# Patient Record
Sex: OTHER/UNKNOWN | Race: WHITE | NOT HISPANIC OR LATINO | Employment: STUDENT
[De-identification: names, ages, dates, MRNs, and addresses within clinical notes are randomized per-mention and may not be internally consistent; named-entity substitution may affect disease eponyms.]

---
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
## 2024-01-18 ENCOUNTER — OFFICE VISIT (OUTPATIENT)
Dept: NEUROLOGY | Facility: CLINIC | Age: 19
End: 2024-01-18
Payer: COMMERCIAL

## 2024-01-18 DIAGNOSIS — G43.709 CHRONIC MIGRAINE WITHOUT AURA WITHOUT STATUS MIGRAINOSUS, NOT INTRACTABLE: Primary | ICD-10-CM

## 2024-01-18 PROCEDURE — 64615 CHEMODENERV MUSC MIGRAINE: CPT | Performed by: PSYCHIATRY & NEUROLOGY

## 2024-01-18 NOTE — LETTER
1/18/2024         RE: El Chen  [address] [facility] [address]        Dear Colleague,    Thank you for referring your patient, El Chen, to the [facility] NEUROLOGY CLINICS [facility]. Please see a copy of my visit note below.    [facility]  Botulinum Toxin Procedure    Alea Garsia MD  Headache Neurology    January 18, 2024    Procedure:  OnabotulinumtoxinA injections for chronic migraine  Indication:  Chronic migraine     El Chen suffers from severe intractable headaches. El Chen was referred by Maliha Soriano NP for onabotulinumtoxinA injections for headache.       Their baseline headaches are sharp or throbbing pain located at the temples or behind the eyes with accompanied photophobia, phonophobia, nausea, fatigue and untreated headaches could last 1-2 days.     Prior to initiation of botulinum toxin injections, El reported 30/30 headache days per month, with 5/30 severe headache days per month. El Bobos headaches are quite disabling and often interfere with El Bobos ability to function normally.     Date of last injections: 10/20/23    April reports 30/30 headache days per month currently, with 3 severe headache days per month. Has been in recovery from a bilateral masectomy and this worsened headaches. El Chen has not noticed a wearing off phenomenon prior to this round of botulinum toxin injections.    El reports the following benefits of botulinum toxin injections from their last round: Has needed to miss 3-4 days of housework or events attended due to headaches in a 3 month period and this used to be 4-5 before Botox.     El has attempted other migraine prophylactic treatments in the past, which have included: topiramate, metoprolol, sertraline, gabapentin, magnesium.     El does not currently take other medication for headache prevention.  They use duloxetine,  propranolol for other reasons.       April's pain was assessed prior to the procedure.  El Chen rated El hCen's pain today as 4 out of 10.    Procedural Pause: Procedural pause was conducted to verify correct patient identity, procedure to be performed, correct side and site, correct patient position, and special requirements. Appropriate hand hygiene was utilized, and each injection site was prepped with alcohol wipe or Chloraprep swab.     Procedure Details: 200 units of onabotulinumtoxinA was diluted in 4 mL 0.9% normal saline. A total of 150 units of onabotulinumtoxinA were injected using 30 gauge 0.5 in needles into the muscles listed below. 50 units of onabotulinumtoxinA were wasted.     Injection Sites: Total = 150 units onabotulinumtoxinA      and Procerus muscles - 5 units into the left and right corrugators and 5 units into the procerus (15 units total)    Frontalis muscles - 5 units into the left superior frontalis and 5 units into the right superior frontalis (2 injection sites per muscle) (10 units total)    Temporalis muscles - 12.5 units into the left temporalis muscle and 12.5 units into the right temporalis muscle (2 injection sites per muscle) (25 units total)    Occipitalis muscles - 12.5 units into the left occipitalis muscle and 12.5 units into the right occipitalis muscle (2 injection sites per muscle) (25 units total)    Splenius Capitis muscles - 12.5 units into the left splenius capitis muscle and 12.5 units into the right splenius capitis muscle (2 injection sites per muscle, divided into 2/3 anteriorly and 1/3 posteriorly) (25 units total)      Trapezius muscles - 25 units into the left trapezius muscle and 25 units into the right trapezius muscle (3 injection sites per muscle, divided 5 units, 10 units, 10 units, medial to lateral) (50 units total)      April tolerated the procedure well without immediate complications.  El Chen will follow up in clinic for assessment of the  effectiveness of treatment.  El Chen did not report any change in El Chen's pain level after the botulinumtoxinA injection procedure.    Alea Garsia MD  Headache Neurology  [facility]       Again, thank you for allowing me to participate in the care of your patient.        Sincerely,        Alea Garsia MD

## 2024-01-18 NOTE — PROGRESS NOTES
[facility]  Botulinum Toxin Procedure    Alea Garsia MD  Headache Neurology    January 18, 2024    Procedure:  OnabotulinumtoxinA injections for chronic migraine  Indication:  Chronic migraine     El Chen suffers from severe intractable headaches. El Chen was referred by Maliha Soriano NP for onabotulinumtoxinA injections for headache.       Their baseline headaches are sharp or throbbing pain located at the temples or behind the eyes with accompanied photophobia, phonophobia, nausea, fatigue and untreated headaches could last 1-2 days.     Prior to initiation of botulinum toxin injections, El reported 30/30 headache days per month, with 5/30 severe headache days per month. El Bobos headaches are quite disabling and often interfere with El Bobos ability to function normally.     Date of last injections: 10/20/23    April reports 30/30 headache days per month currently, with 3 severe headache days per month. Has been in recovery from a bilateral masectomy and this worsened headaches. El Chen has not noticed a wearing off phenomenon prior to this round of botulinum toxin injections.    El reports the following benefits of botulinum toxin injections from their last round: Has needed to miss 3-4 days of housework or events attended due to headaches in a 3 month period and this used to be 4-5 before Botox.     El has attempted other migraine prophylactic treatments in the past, which have included: topiramate, metoprolol, sertraline, gabapentin, magnesium.     El does not currently take other medication for headache prevention.  They use duloxetine,  propranolol for other reasons.      Jeramies pain was assessed prior to the procedure.  El Chen rated El Bobos pain today as 4 out of 10.    Procedural Pause: Procedural pause was conducted to verify correct patient identity, procedure to be performed, correct side and site,  correct patient position, and special requirements. Appropriate hand hygiene was utilized, and each injection site was prepped with alcohol wipe or Chloraprep swab.     Procedure Details: 200 units of onabotulinumtoxinA was diluted in 4 mL 0.9% normal saline. A total of 150 units of onabotulinumtoxinA were injected using 30 gauge 0.5 in needles into the muscles listed below. 50 units of onabotulinumtoxinA were wasted.     Injection Sites: Total = 150 units onabotulinumtoxinA      and Procerus muscles - 5 units into the left and right corrugators and 5 units into the procerus (15 units total)    Frontalis muscles - 5 units into the left superior frontalis and 5 units into the right superior frontalis (2 injection sites per muscle) (10 units total)    Temporalis muscles - 12.5 units into the left temporalis muscle and 12.5 units into the right temporalis muscle (2 injection sites per muscle) (25 units total)    Occipitalis muscles - 12.5 units into the left occipitalis muscle and 12.5 units into the right occipitalis muscle (2 injection sites per muscle) (25 units total)    Splenius Capitis muscles - 12.5 units into the left splenius capitis muscle and 12.5 units into the right splenius capitis muscle (2 injection sites per muscle, divided into 2/3 anteriorly and 1/3 posteriorly) (25 units total)      Trapezius muscles - 25 units into the left trapezius muscle and 25 units into the right trapezius muscle (3 injection sites per muscle, divided 5 units, 10 units, 10 units, medial to lateral) (50 units total)      April tolerated the procedure well without immediate complications.  El Chen will follow up in clinic for assessment of the effectiveness of treatment.  El Chen did not report any change in El Chen's pain level after the botulinumtoxinA injection procedure.    Alea Garsia MD  Headache Neurology  [facility]

## 2024-02-06 ENCOUNTER — VIRTUAL VISIT (OUTPATIENT)
Dept: NEUROLOGY | Facility: CLINIC | Age: 19
End: 2024-02-06
Payer: COMMERCIAL

## 2024-02-06 ENCOUNTER — TELEPHONE (OUTPATIENT)
Dept: NEUROLOGY | Facility: CLINIC | Age: 19
End: 2024-02-06

## 2024-02-06 DIAGNOSIS — R51.9 PERSISTENT HEADACHES: Primary | ICD-10-CM

## 2024-02-06 DIAGNOSIS — G43.719 INTRACTABLE CHRONIC MIGRAINE WITHOUT AURA AND WITHOUT STATUS MIGRAINOSUS: ICD-10-CM

## 2024-02-06 PROCEDURE — 99215 OFFICE O/P EST HI 40 MIN: CPT | Mod: 95 | Performed by: NURSE PRACTITIONER

## 2024-02-06 RX ORDER — ATOGEPANT 60 MG/1
60 TABLET ORAL DAILY
Qty: 30 TABLET | Refills: 11 | Status: SHIPPED | OUTPATIENT
Start: 2024-02-06 | End: 2024-08-12

## 2024-02-06 ASSESSMENT — MIGRAINE DISABILITY ASSESSMENT (MIDAS)
TOTAL SCORE: 8
HOW MANY DAYS DID YOU NOT DO HOUSEWORK BECAUSE OF HEADACHES: 1
HOW MANY DAYS IN THE PAST 3 MONTHS HAVE YOU HAD A HEADACHE: 90
HOW OFTEN WERE SOCIAL ACTIVITIES MISSED DUE TO HEADACHES: 1
HOW MANY DAYS WAS YOUR PRODUCTIVITY CUT IN HALF BECAUSE OF HEADACHES: 3
ON A SCALE FROM 0-10 ON AVERAGE HOW PAINFUL WERE HEADACHES: 4
HOW MANY DAYS WAS HOUSEWORK PRODUCTIVITY CUT IN HALF DUE TO HEADACHES: 2
HOW MANY DAYS DID YOU MISS WORK OR SCHOOL BECAUSE OF HEADACHES: 1

## 2024-02-06 ASSESSMENT — HEADACHE IMPACT TEST (HIT 6)
HOW OFTEN DID HEADACHS LIMIT CONCENTRATION ON WORK OR DAILY ACTIVITY: SOMETIMES
HIT6 TOTAL SCORE: 59
WHEN YOU HAVE A HEADACHE HOW OFTEN DO YOU WISH YOU COULD LIE DOWN: VERY OFTEN
HOW OFTEN HAVE YOU FELT TOO TIRED TO WORK BECAUSE OF YOUR HEADACHES: RARELY
WHEN YOU HAVE HEADACHES HOW OFTEN IS THE PAIN SEVERE: SOMETIMES
HOW OFTEN HAVE YOU FELT FED UP OR IRRITATED BECAUSE OF YOUR HEADACHES: SOMETIMES
HOW OFTEN DO HEADACHES LIMIT YOUR DAILY ACTIVITIES: SOMETIMES

## 2024-02-06 NOTE — LETTER
2/6/2024       RE: El Chen  [address] [place] [address]       Dear Colleague,    Thank you for referring your patient, El Chen, to the [facility] NEUROLOGY CLINIC [place] at [facility]. Please see a copy of my visit note below.    El is a 18 year old who is being evaluated via a billable video visit.          Assessment & Plan  (R51.9) Persistent headaches  (primary encounter diagnosis)  (G43.719) Intractable chronic migraine without aura and without status migrainosus  Comment: Headaches daily do not respond to treatment.   Brain MRI in 2016 -unreasonable to update  Eye exam -a month ago and presumed no papilledema   Elevated ESR and CRP in Dec -followed by primary care -would suggest to repeat per PCP  Continue Botox-remains effective  Adding qulipta 60 mg daily for headache prevention. May cause nausea.   Rescue treatment -will continue with rizatriptan as needed  Follow up in 3 month after starting qulipta     Subjective  El is a 18 year old, presenting for the following health issues: Headache follow-up  diagnosed with a hypermobility disorder primarily affecting the knees and treated by PM&R at the [facility]   Headache treatment Botox on 1/18/2024 -at least 4 rounds. Helps to reduce really bad headaches. Still has daily headaches 3-4/10 on the numeric pain scale. Just in the temples -daily headaches.   With migraines-temples but goes behind the eyes and in the back of the head.   Daily headaches for 10 years  Moderate severity and reduce functionality but Botox helps with reducing headache that would stop patient for being functional.   Daily headaches in the temples and occasionally behind eyes. No jaw pain and no TMJ problems. No light sensitivity with moderated. Hard to fall asleep but headaches do not wake patient up. Not positional headaches.   No diplopia with daily headaches  Has corrective glasses and will have eye  "surgery -waiting to be scheduled  Hydrated   Has sleep apnea and has a CPAP and trying to be diligent in using it  Schedule inconsistent -school and working night every other weekend  Headaches have not changed with school or work   Never takes OTC analgesics for headaches.     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     Headache treatment   Took amitriptyline 25 mg for a while and made headaches worse at night and after stopping amitriptyline \"worsening headaches at night\" improved   Currently Botox   Rizatriptan as needed and helps with the pain -\"makes it bearable\" does not make it to go away but works the best than the other treatments and ondansetron as needed -takes it may be once every 3 month   Biofeedback, acupincture  and physical therapy for MSK/joint it did not help   Propranolol 160 mg daily -helps with cardiac symptoms and did not help with  headaches at all  Magnesium   Rizatriptan as needed currently on   Amitriptyline 25 mg at bedtime   Topiramate 25 mg BID tried -does not recall side effects   Metoprolol  Tried  Propranolol 80 mg ER started a few days ago   Duloxetine caused worsening headaches   On Zoloft and works good  On gabapentin 600 mg three times daily   Ondansetron as needed       family history -maternal grandmother gets migraine headaches  Hypermobility spectrum disorder but no family history. Saw ortho and was recommended to see a  and was not able to get in. Patient will talk to PCP.       DATA reviewed  NISH negative  ESR and CRP elevated in 12/15/2023  ALT and AST elevated -Hepatologist works   HgA1C 5.3   TSH 1.33 normal on 10/3/2023  Normal Cr 0.75 and eGFR >90 on 4/16/2023  CBC on 4/16/2023 -normal     MR BRAIN W/O & W CONTRAST  Order: [id number]  Impression      Normal head MRI.    FINDINGS:  No diffusion restriction, abnormal T2 hyperintensity, or pathologic contrast enhancement. No foci of hypointensity on gradient imaging to suggest microhemorrhage/hemosiderin deposition.  " "Mucous retention cyst both maxillary sinuses.    Objective   Vitals - Patient Reported  Weight (Patient Reported): 111.1 kg (245 lb)  Height (Patient Reported): 162.6 cm (5' 4\")  BMI (Based on Pt Reported Ht/Wt): 42.05  Pain Score: Moderate Pain (4)  Pain Loc: Head    Physical Exam   Patient sounds alert and no in apparent acute distress,  mentation appears normal, judgement and insight intact, normal speech, no weakness observed    The longitudinal plan of care for chronic headaches was addressed during this visit. Due to the added complexity in care, I will continue to support El Chen in the subsequent management of this condition and with the ongoing continuity of care of this condition    42 minutes spent on the date of the encounter doing video access, chart  review, meds review, treatment plan, documentation and further activities as noted above          Again, thank you for allowing me to participate in the care of your patient.      Sincerely,    CARLOYN Broderick CNP    "

## 2024-02-06 NOTE — NURSING NOTE
Is the patient currently in the state of [place]? YES    Visit mode:VIDEO    If the visit is dropped, the patient can be reconnected by: TELEPHONE VISIT: Phone number:   Telephone Information:   Mobile [phone]       Will anyone else be joining the visit? NO  (If patient encounters technical issues they should call [phone]    How would you like to obtain your AVS? MyChart    Are changes needed to the allergy or medication list? Pt stated no med changes    Reason for visit: Video Visit (Follow-up )    Brooklynn PACHECO

## 2024-02-06 NOTE — TELEPHONE ENCOUNTER
"Prior Authorization Retail Medication Request    Medication/Dose: Qulipta 60 mg  Diagnosis and ICD code (if different than what is on RX):    New/renewal/insurance change PA/secondary ins. PA:  Previously Tried and Failed:  Took amitriptyline 25 mg for a while and made headaches worse at night and after stopping amitriptyline \"worsening headaches at night\" improved   Currently Botox   Rizatriptan as needed and helps with the pain -\"makes it bearable\" does not make it to go away but works the best than the other treatments and ondansetron as needed -takes it may be once every 3 month   Biofeedback, acupincture  and physical therapy for MSK/joint it did not help   Propranolol 160 mg daily -helps with cardiac symptoms and did not help with  headaches at all  Magnesium   Rizatriptan as needed currently on   Amitriptyline 25 mg at bedtime   Topiramate 25 mg BID tried -does not recall side effects   Metoprolol  Tried  Propranolol 80 mg ER started a few days ago   Duloxetine caused worsening headaches   On Zoloft and works good  On gabapentin 600 mg three times daily   Ondansetron as needed     Rationale:  daily headaches 3-4/10 on the numeric pain scale. Just in the temples -daily headaches     Insurance   Primary: [employer]  Insurance ID:  [id number]    Secondary (if applicable):  Insurance ID:      Pharmac Information (if different than what is on RX)  Name:    Phone:    Fax:   "

## 2024-02-06 NOTE — PATIENT INSTRUCTIONS
Brain MRI in 2016 -unreasonable to update  Eye exam -a month ago and presumed no papilledema   Elevated ESR and CRP in Dec -followed by primary care -would suggest to repeat per PCP  Continue Botox-remains effective  Adding qulipta 60 mg daily for headache prevention. May cause nausea.   Rescue treatment -will continue with rizatriptan as needed  Follow up in 3 month after starting qulipta     Imaging Center Scheduling  Please contact us to schedule your imaging visit at any of our [facility] Locations  [facility]  (All Imaging Center locations)  Phone: [phone]    [facility]  ([facility], [facility], [facility])  Phone: [phone]    [facility]  ([facility], [facility])  Phone: [phone]    [facility]   Phone: [phone]

## 2024-02-06 NOTE — PROGRESS NOTES
El is a 18 year old who is being evaluated via a billable video visit.          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   Brain MRI in 2016 -unreasonable to update  Eye exam -a month ago and presumed no papilledema   Elevated ESR and CRP in Dec -followed by primary care -would suggest to repeat per PCP  Continue Botox-remains effective  Adding qulipta 60 mg daily for headache prevention. May cause nausea.   Rescue treatment -will continue with rizatriptan as needed  Follow up in 3 month after starting qulipta     Subjective   El is a 18 year old, presenting for the following health issues: Headache follow-up  diagnosed with a hypermobility disorder primarily affecting the knees and treated by PM&R at the [facility]   Headache treatment Botox on 1/18/2024 -at least 4 rounds. Helps to reduce really bad headaches. Still has daily headaches 3-4/10 on the numeric pain scale. Just in the temples -daily headaches.   With migraines-temples but goes behind the eyes and in the back of the head.   Daily headaches for 10 years  Moderate severity and reduce functionality but Botox helps with reducing headache that would stop patient for being functional.   Daily headaches in the temples and occasionally behind eyes. No jaw pain and no TMJ problems. No light sensitivity with moderated. Hard to fall asleep but headaches do not wake patient up. Not positional headaches.   No diplopia with daily headaches  Has corrective glasses and will have eye surgery -waiting to be scheduled  Hydrated   Has sleep apnea and has a CPAP and trying to be diligent in using it  Schedule inconsistent -school and working night every other weekend  Headaches have not changed with school or work   Never takes OTC analgesics for headaches.     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     Headache treatment   Took amitriptyline 25 mg for a while and made headaches worse at night and after stopping amitriptyline \"worsening headaches at night\" improved   Currently Botox   Rizatriptan as needed and helps with the pain -\"makes it bearable\" does not make it to go away but works the best than the other treatments and ondansetron as needed -takes it may be once every 3 month   Biofeedback, acupincture  and physical therapy for MSK/joint it did not help   Propranolol 160 mg daily -helps with cardiac symptoms and did not help with  headaches at all  Magnesium   Rizatriptan as needed currently on   Amitriptyline 25 mg at bedtime   Topiramate 25 mg BID tried -does not recall side effects   Metoprolol  Tried  Propranolol 80 mg ER started a few days ago   Duloxetine caused worsening headaches   On Zoloft and works good  On gabapentin 600 mg three times daily   Ondansetron as needed       family history -maternal grandmother gets migraine headaches  Hypermobility spectrum disorder but no family history. Saw ortho and was recommended to see a  and was not able to get in. Patient will talk to PCP.       DATA reviewed  NISH negative  ESR and CRP elevated in 12/15/2023  ALT and AST elevated -Hepatologist works   HgA1C 5.3   TSH 1.33 normal on 10/3/2023  Normal Cr 0.75 and eGFR >90 on 4/16/2023  CBC on 4/16/2023 -normal     MR BRAIN W/O & W CONTRAST  Order: [id number]  Impression      Normal head MRI.    FINDINGS:  No diffusion restriction, abnormal T2 hyperintensity, or pathologic contrast enhancement. No foci of hypointensity on gradient imaging to suggest microhemorrhage/hemosiderin deposition.  Mucous retention cyst both maxillary sinuses.    Objective    Vitals - Patient Reported  Weight (Patient Reported): 111.1 kg (245 lb)  Height (Patient Reported): 162.6 cm (5' 4\")  BMI (Based on Pt Reported Ht/Wt): 42.05  Pain Score: Moderate Pain (4)  Pain Loc: Head    Physical Exam   Patient sounds alert " and no in apparent acute distress,  mentation appears normal, judgement and insight intact, normal speech, no weakness observed    The longitudinal plan of care for chronic headaches was addressed during this visit. Due to the added complexity in care, I will continue to support El Chen in the subsequent management of this condition and with the ongoing continuity of care of this condition    42 minutes spent on the date of the encounter doing video access, chart  review, meds review, treatment plan, documentation and further activities as noted above    CAROLYN Sandoval, CNP [facility]  Headache certified  [facility]      Video-Visit Details    Type of service:  Video Visit   Originating Location (pt. Location): Home  Distant Location (provider location):  On-site  Platform used for Video Visit: [facility]  Signed Electronically by: CAROLYN Broderick CNP

## 2024-02-19 NOTE — TELEPHONE ENCOUNTER
Prior Authorization Approval    Medication: QULIPTA 60 MG PO TABS  Authorization Effective Date: 1/20/2024  Authorization Expiration Date: 8/19/2024  Approved Dose/Quantity: 30/30  Reference #: [id number]   Insurance Company: [employer] [place] - Phone [phone] Fax [phone]  Expected CoPay: $    CoPay Card Available:      Financial Assistance Needed:   Which Pharmacy is filling the prescription: [id number] - [place], [place] - [address] AT [facility] [address]  Pharmacy Notified: Yes  Patient Notified: Yes

## 2024-02-19 NOTE — TELEPHONE ENCOUNTER
PA Initiation    Medication: QULIPTA 60 MG PO TABS  Insurance Company: [employer] [place] - Phone [phone] Fax [phone]  Pharmacy Filling the Rx: [id number] - [place], [place] - [facility] AT [facility] [address]  Filling Pharmacy Phone: [phone]  Filling Pharmacy Fax:    Start Date: 2/19/2024

## 2024-04-22 DIAGNOSIS — G43.709 CHRONIC MIGRAINE WITHOUT AURA WITHOUT STATUS MIGRAINOSUS, NOT INTRACTABLE: ICD-10-CM

## 2024-04-22 NOTE — TELEPHONE ENCOUNTER
" Health Call Center    Phone Message    May a detailed message be left on voicemail: yes     Reason for Call: Medication Refill Request    Has the patient contacted the pharmacy for the refill? Yes   Name of medication being requested: propranolol ER (INDERAL LA) 160 MG 24 hr capsule  Provider who prescribed the medication: Maliha Soriano APRN CNP  Pharmacy: [id number] - [place], [place] - [address] AT [facility] [address]  Date medication is needed: 05/02/2024     Patient requesting refills sent to pharmacy listed above (not [place] location)  Patient was told by pharmacy that the provider had \"closed\" the medication and they could not refill it.   Patient states she had 1.5 weeks of medication left before she will be out.     Action Taken: Message routed to:  [facility] Neurology    Travel Screening: Not Applicable                                                                    "

## 2024-04-26 RX ORDER — PROPRANOLOL HYDROCHLORIDE 160 MG/1
160 CAPSULE, EXTENDED RELEASE ORAL DAILY
Qty: 30 CAPSULE | Refills: 9 | Status: SHIPPED | OUTPATIENT
Start: 2024-04-26

## 2024-06-15 ASSESSMENT — MIGRAINE DISABILITY ASSESSMENT (MIDAS)
HOW MANY DAYS DID YOU NOT DO HOUSEWORK BECAUSE OF HEADACHES: 0
ON A SCALE FROM 0-10 ON AVERAGE HOW PAINFUL WERE HEADACHES: 4
HOW OFTEN WERE SOCIAL ACTIVITIES MISSED DUE TO HEADACHES: 1
HOW MANY DAYS IN THE PAST 3 MONTHS HAVE YOU HAD A HEADACHE: 90
HOW MANY DAYS DID YOU MISS WORK OR SCHOOL BECAUSE OF HEADACHES: 0
TOTAL SCORE: 3
HOW MANY DAYS WAS HOUSEWORK PRODUCTIVITY CUT IN HALF DUE TO HEADACHES: 0
HOW MANY DAYS WAS YOUR PRODUCTIVITY CUT IN HALF BECAUSE OF HEADACHES: 2

## 2024-06-15 ASSESSMENT — HEADACHE IMPACT TEST (HIT 6)
HIT6 TOTAL SCORE: 59
HOW OFTEN HAVE YOU FELT TOO TIRED TO WORK BECAUSE OF YOUR HEADACHES: RARELY
HOW OFTEN DO HEADACHES LIMIT YOUR DAILY ACTIVITIES: SOMETIMES
WHEN YOU HAVE HEADACHES HOW OFTEN IS THE PAIN SEVERE: SOMETIMES
HOW OFTEN DID HEADACHS LIMIT CONCENTRATION ON WORK OR DAILY ACTIVITY: SOMETIMES
WHEN YOU HAVE A HEADACHE HOW OFTEN DO YOU WISH YOU COULD LIE DOWN: VERY OFTEN
HOW OFTEN HAVE YOU FELT FED UP OR IRRITATED BECAUSE OF YOUR HEADACHES: SOMETIMES

## 2024-06-20 ENCOUNTER — OFFICE VISIT (OUTPATIENT)
Dept: NEUROLOGY | Facility: CLINIC | Age: 19
End: 2024-06-20
Payer: COMMERCIAL

## 2024-06-20 ENCOUNTER — VIRTUAL VISIT (OUTPATIENT)
Dept: NEUROLOGY | Facility: CLINIC | Age: 19
End: 2024-06-20
Payer: COMMERCIAL

## 2024-06-20 ENCOUNTER — TELEPHONE (OUTPATIENT)
Dept: NEUROLOGY | Facility: CLINIC | Age: 19
End: 2024-06-20

## 2024-06-20 VITALS — WEIGHT: 230 LBS | BODY MASS INDEX: 39.48 KG/M2

## 2024-06-20 DIAGNOSIS — G43.709 CHRONIC MIGRAINE WITHOUT AURA WITHOUT STATUS MIGRAINOSUS, NOT INTRACTABLE: Primary | ICD-10-CM

## 2024-06-20 DIAGNOSIS — G43.009 MIGRAINE WITHOUT AURA AND WITHOUT STATUS MIGRAINOSUS, NOT INTRACTABLE: ICD-10-CM

## 2024-06-20 PROCEDURE — 99212 OFFICE O/P EST SF 10 MIN: CPT | Mod: 95 | Performed by: NURSE PRACTITIONER

## 2024-06-20 PROCEDURE — 64615 CHEMODENERV MUSC MIGRAINE: CPT

## 2024-06-20 RX ORDER — RIZATRIPTAN BENZOATE 5 MG/1
5-10 TABLET, ORALLY DISINTEGRATING ORAL
Qty: 18 TABLET | Refills: 6 | Status: SHIPPED | OUTPATIENT
Start: 2024-06-20

## 2024-06-20 NOTE — LETTER
"6/20/2024       RE: El Chen  [address] [address]       Dear Colleague,    Thank you for referring your patient, El Chen, to the [facility] [place] [facility]. Please see a copy of my visit note below.      [facility]    Headache Neurology Progress Note  June 20, 2024    Subjective:    El Chen returns for follow up of headache   Have not have severe migraines that would take patient out school or work. Able to function.   Still have headaches the same as always but no really severe migraines. Have not any severe migraine since starting qulipta in Feb 2024 so \"must be doing something.\" No side effects with qulipta. Have not have to use rizatriptan at all since starting qulipta  Have been doing Botox and next appointment today. Late on Botox may be 1-2 months late. Increase in headaches severity the time should have Botox but no severe migraine. When Botox on time 2-3/10 -much more manageable and now daily headaches close to 3-4/10 on the numeric pain scale.    No OTC meds are used-never helped   Propranolol 160 mg ER for heart symptoms and migraines. 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       Headache treatment   Took amitriptyline 25 mg for a while and made headaches worse at night and after stopping amitriptyline \"worsening headaches at night\" improved   Currently Botox   Rizatriptan as needed and helps with the pain -\"makes it bearable\" does not make it to go away but works the best than the other treatments and ondansetron as needed -takes it may be once every 3 month   Biofeedback, acupincture  and physical therapy for MSK/joint it did not help   Propranolol 160 mg daily -helps with cardiac symptoms and did not help with  headaches at all  Magnesium   Rizatriptan as needed currently on   Amitriptyline 25 mg at " bedtime   Topiramate 25 mg BID tried -does not recall side effects   Metoprolol  Tried  Propranolol 80 mg ER started a few days ago   Duloxetine caused worsening headaches   On Zoloft and works good  On gabapentin 600 mg three times daily   Ondansetron as needed       family history -maternal grandmother gets migraine headaches  Hypermobility spectrum disorder but no family history. Saw ortho and was recommended to see a  and was not able to get in. Patient will talk to PCP.     Objective:    Vitals: Wt 104.3 kg (230 lb)   BMI 39.48 kg/m    General: Cooperative, NAD  Neurologic:  Mental Status: Fully alert, attentive and oriented. Speech clear and fluent.   Cranial Nerves: Facial movements symmetric.   Motor: No abnormal movements.      Pertinent Investigations:    MR BRAIN W/O CONTRAST 1/12/2023 6:48 AM     Provided History: Persistent headaches  ICD-10: Persistent headaches     Comparison:  None.      Technique: Sagittal T1-weighted, coronal T2-weighted, axial T2 FLAIR,  axial susceptibility weighted, and axial diffusion-weighted with ADC  map images of the brain were obtained without intravenous contrast.     Findings: No intracranial mass lesion, mass effect, midline shift, or  abnormal fluid collection. The ventricles and sulci are normal for  age. No abnormality of reduced diffusion.  Normal intravascular flow  voids.     Normal calvarium. Mild mucosal thickening in bilateral maxillary  sinuses. Orbits are unremarkable.                                                                  Impression: Normal brain MRI without abnormalities to explain symptoms  of persistent headache.     I have personally reviewed the examination and initial interpretation  and I agree with the findings.     STEFANIA GODFREY MD               11/6/2023    11:43 AM 2/6/2024     7:18 AM 6/15/2024     1:58 PM   HIT-6   When you have headaches, how often is the pain severe 10 10 10   How often do headaches limit your ability to  do usual daily activities including household work, work, school, or social activities? 10 10 10   When you have a headache, how often do you wish you could lie down? 11 11 11   In the past 4 weeks, how often have you felt too tired to do work or daily activities because of your headaches 8 8 8   In the past 4 weeks, how often have you felt fed up or irritated because of your headaches 10 10 10   In the past 4 weeks, how often did headaches limit your ability to concentrate on work or daily activities 10 10 10   HIT-6 Total Score 59 59 59           11/6/2023    11:45 AM 2/6/2024     7:19 AM 6/15/2024     1:59 PM   MIDAS - in the past three months:   On how many days did you miss work or school because of your headaches? 0 1 0   How many days was your productivity at work or school reduced by half or more because of your headaches? 3 3 2   On how many days did you not do household work because of your headaches? 0 1 0   How many days was your productivity in household work reduced by half or more because of your headaches? 2 2 0   On how many days did you miss family, social, or leisure activities because of your headaches? 1 1 1   On how many days did you have a headache? 90 90 90   On a scale of 0-10, on average how painful were these headaches? 4 4 4   MIDAS Score 6 (II - Mild Disability) 8 (II - Mild Disability) 3 (I - Little or No Disability)      Assessment/Plan:   El Chen is a 18 year old   Chronic migraine stable with current headache treatment   No new changes    Acute Treatment:  -For acute treatment of moderate to severe headache, take rizatriptan at the onset of headache, with a repeat dose in two hours if needed. Do not exceed more than 9 days per month to avoid medication overuse.  -For headache related nausea, or as a rescue medicine for headache, take ondansetron as needed.     Preventive Treatment:  -For headache prevention, continue Botox, qulipta and propranolol     Follow up in a year if  stable or sooner if needed     All of patient's questions were answered from the best of my knowledge.  Patient is in agreement with the plan.     14 minutes spent on the date of the encounter doing video access, chart  review, exam, results review,  meds review, treatment plan, documentation and further activities as noted above      Again, thank you for allowing me to participate in the care of your patient.      Sincerely,    CAROLYN Broderick CNP

## 2024-06-20 NOTE — PATIENT INSTRUCTIONS
Acute Treatment:  -For acute treatment of moderate to severe headache, take rizatriptan at the onset of headache, with a repeat dose in two hours if needed. Do not exceed more than 9 days per month to avoid medication overuse.  -For headache related nausea, or as a rescue medicine for headache, take ondansetron as needed.     Preventive Treatment:  -For headache prevention, continue Botox, qulipta and propranolol     Follow up in a year if stable or sooner if needed

## 2024-06-20 NOTE — PROGRESS NOTES
"Virtual Visit Details    Type of service:  Video Visit   Originating Location (pt. Location): Home  Distant Location (provider location):  Off-site  Platform used for Video Visit: [facility]    Headache Neurology Progress Note  June 20, 2024    Subjective:    El Chen returns for follow up of headache   Have not have severe migraines that would take patient out school or work. Able to function.   Still have headaches the same as always but no really severe migraines. Have not any severe migraine since starting qulipta in Feb 2024 so \"must be doing something.\" No side effects with qulipta. Have not have to use rizatriptan at all since starting qulipta  Have been doing Botox and next appointment today. Late on Botox may be 1-2 months late. Increase in headaches severity the time should have Botox but no severe migraine. When Botox on time 2-3/10 -much more manageable and now daily headaches close to 3-4/10 on the numeric pain scale.    No OTC meds are used-never helped   Propranolol 160 mg ER for heart symptoms and migraines. 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       Headache treatment   Took amitriptyline 25 mg for a while and made headaches worse at night and after stopping amitriptyline \"worsening headaches at night\" improved   Currently Botox   Rizatriptan as needed and helps with the pain -\"makes it bearable\" does not make it to go away but works the best than the other treatments and ondansetron as needed -takes it may be once every 3 month   Biofeedback, acupincture  and physical therapy for MSK/joint it did not help   Propranolol 160 mg daily -helps with cardiac symptoms and did not help with  headaches at all  Magnesium   Rizatriptan as needed currently on   Amitriptyline 25 mg at bedtime   Topiramate 25 mg BID tried -does not recall side effects   Metoprolol  Tried  Propranolol 80 mg ER started a few days " ago   Duloxetine caused worsening headaches   On Zoloft and works good  On gabapentin 600 mg three times daily   Ondansetron as needed       family history -maternal grandmother gets migraine headaches  Hypermobility spectrum disorder but no family history. Saw ortho and was recommended to see a  and was not able to get in. Patient will talk to PCP.     Objective:    Vitals: Wt 104.3 kg (230 lb)   BMI 39.48 kg/m    General: Cooperative, NAD  Neurologic:  Mental Status: Fully alert, attentive and oriented. Speech clear and fluent.   Cranial Nerves: Facial movements symmetric.   Motor: No abnormal movements.      Pertinent Investigations:    MR BRAIN W/O CONTRAST 1/12/2023 6:48 AM     Provided History: Persistent headaches  ICD-10: Persistent headaches     Comparison:  None.      Technique: Sagittal T1-weighted, coronal T2-weighted, axial T2 FLAIR,  axial susceptibility weighted, and axial diffusion-weighted with ADC  map images of the brain were obtained without intravenous contrast.     Findings: No intracranial mass lesion, mass effect, midline shift, or  abnormal fluid collection. The ventricles and sulci are normal for  age. No abnormality of reduced diffusion.  Normal intravascular flow  voids.     Normal calvarium. Mild mucosal thickening in bilateral maxillary  sinuses. Orbits are unremarkable.                                                                      Impression: Normal brain MRI without abnormalities to explain symptoms  of persistent headache.     I have personally reviewed the examination and initial interpretation  and I agree with the findings.     STEFANIA GODFREY MD               11/6/2023    11:43 AM 2/6/2024     7:18 AM 6/15/2024     1:58 PM   HIT-6   When you have headaches, how often is the pain severe 10 10 10   How often do headaches limit your ability to do usual daily activities including household work, work, school, or social activities? 10 10 10   When you have a headache,  how often do you wish you could lie down? 11 11 11   In the past 4 weeks, how often have you felt too tired to do work or daily activities because of your headaches 8 8 8   In the past 4 weeks, how often have you felt fed up or irritated because of your headaches 10 10 10   In the past 4 weeks, how often did headaches limit your ability to concentrate on work or daily activities 10 10 10   HIT-6 Total Score 59 59 59           11/6/2023    11:45 AM 2/6/2024     7:19 AM 6/15/2024     1:59 PM   MIDAS - in the past three months:   On how many days did you miss work or school because of your headaches? 0 1 0   How many days was your productivity at work or school reduced by half or more because of your headaches? 3 3 2   On how many days did you not do household work because of your headaches? 0 1 0   How many days was your productivity in household work reduced by half or more because of your headaches? 2 2 0   On how many days did you miss family, social, or leisure activities because of your headaches? 1 1 1   On how many days did you have a headache? 90 90 90   On a scale of 0-10, on average how painful were these headaches? 4 4 4   MIDAS Score 6 (II - Mild Disability) 8 (II - Mild Disability) 3 (I - Little or No Disability)        Assessment/Plan:   El Chen is a 18 year old   Chronic migraine stable with current headache treatment   No new changes    Acute Treatment:  -For acute treatment of moderate to severe headache, take rizatriptan at the onset of headache, with a repeat dose in two hours if needed. Do not exceed more than 9 days per month to avoid medication overuse.  -For headache related nausea, or as a rescue medicine for headache, take ondansetron as needed.     Preventive Treatment:  -For headache prevention, continue Botox, qulipta and propranolol     Follow up in a year if stable or sooner if needed     All of patient's questions were answered from the best of my knowledge.  Patient is in  agreement with the plan.     14 minutes spent on the date of the encounter doing video access, chart  review, exam, results review,  meds review, treatment plan, documentation and further activities as noted above    CAROLYN Sandoval, CNP [facility]  Headache certified  [facility]

## 2024-06-20 NOTE — NURSING NOTE
Is the patient currently in the state of [place]? YES    Visit mode:VIDEO    If the visit is dropped, the patient can be reconnected by: VIDEO VISIT: Text to cell phone:   Telephone Information:   Mobile [phone]       Will anyone else be joining the visit? NO  (If patient encounters technical issues they should call [phone]    How would you like to obtain your AVS? MyChart    Are changes needed to the allergy or medication list? No    Are refills needed on medications prescribed by this physician?     Reason for visit: Video Visit and RECHECK    Lillie PACHECO

## 2024-06-20 NOTE — PROGRESS NOTES
[facility]  Botulinum Toxin Procedure    Nia Reynolds PA-C  Headache Neurology    June 20, 2024    Procedure: OnabotulinumtoxinA injections for chronic migraine  Indication: Chronic migraine    El Chen suffers from severe intractable headaches. El was referred by Maliha Soriano NP for onabotulinumtoxinA injections for headache.      Their baseline headaches are sharp or throbbing pain located at the temples or behind the eyes with accompanied photophobia, phonophobia, nausea, fatigue and untreated headaches could last 1-2 days.     Prior to initiation of botulinum toxin injections, El reported 30/30 headache days per month, with 5/30 severe headache days per month. El's headaches are quite disabling and often interfere with El's ability to function normally.    Their last round of botulinum toxin injections was on 1/18/2024 (they are overdue for injections today).    El reports 30/30 headache days per month currently, with 0 severe headache days per month. El is overdue for injections today.     El reports the following benefits of botulinum toxin injections from their last round: With both Botox and Qulipta on board, daily headache is down to a 1-2/10 rather than a 3-4/10.     El has attempted other migraine prophylactic treatments in the past, which have included: topiramate, metoprolol, sertraline, gabapentin, magnesium.     El currently takes propranolol, Qulipta, duloxetine.    Patient's pain was assessed prior to the procedure. El rated El's pain today as 4 out of 10.      The procedure was explained to the patient. Benefits of the treatment were discussed including headache and migraine reduction. Risks of the procedure were reviewed including but not limited to pain, bruising, bleeding, infection, and weakness of muscles injected or those distal to injection sites. Alternatives were discussed. The patient voiced understanding of the risks and benefits. All  questions answered and patient consented to proceed.    Procedural Pause: Procedural pause was conducted to verify correct patient identity, procedure to be performed, correct side and site, correct patient position, and special requirements. Appropriate hand hygiene was utilized, and each injection site was prepped with alcohol wipes or Chloraprep swab.     Procedure Details:   200 units of onabotulinumtoxinA was diluted in 4 mL 0.9% normal saline.   A total of 150 units of onabotulinumtoxinA were injected using 30 gauge 0.5 inch needles into the muscles listed below. 50 units of onabotulinumtoxinA were wasted.     Injection Sites: Total = 150 units onabotulinumtoxinA     Procerus muscles - 5 units into the procerus muscle (5 units total)     muscles - 5 units into the left  muscle and 5 units into the right  muscle (1 injection site per muscle) (10 units total)    Frontalis muscles - 5 units into the left superior frontalis muscle and 5 units into the right superior frontalis muscle (2 injection sites per muscle) (10 units total)    Temporalis muscles - 12.5 units into the left temporalis muscle and 12.5 units into the right temporalis muscle (2 injection sites per muscle) (25 units total)    Occipitalis muscles - 12.5 units into the left occipitalis muscle and 12.5 units into the right occipitalis muscle (2 injection sites per muscle) (25 units total)    Splenius Capitis muscles - 12.5 units into the left splenius capitis muscle and 12.5 units into the right splenius capitis muscle (2 injection sites per muscle, divided into 2/3 anteriorly and 1/3 posteriorly) (25 units total)      Trapezius muscles - 25 units into the left trapezius muscle and 25 units into the right trapezius muscle (3 injection sites per muscle, divided into 5 units, 10 units, 10 units, medial to lateral) (50 units total)      Patient tolerated the procedure well without immediate complications. El will follow  up in clinic for assessment of the effectiveness of treatment. El did not report any change in El's pain level after the botulinumtoxinA injection procedure.      Nia Reynolds PA-C  Headache Neurology  [facility]

## 2024-06-20 NOTE — LETTER
6/20/2024      El Chen  [address]  [place] [address]      Dear Colleague,    Thank you for referring your patient, El Chen, to the [facility] NEUROLOGY CLINICS [facility]. Please see a copy of my visit note below.    [facility]  Botulinum Toxin Procedure    Nia Reynolds PA-C  Headache Neurology    June 20, 2024    Procedure: OnabotulinumtoxinA injections for chronic migraine  Indication: Chronic migraine    El Chen suffers from severe intractable headaches. El was referred by Maliha Soriano NP for onabotulinumtoxinA injections for headache.      Their baseline headaches are sharp or throbbing pain located at the temples or behind the eyes with accompanied photophobia, phonophobia, nausea, fatigue and untreated headaches could last 1-2 days.     Prior to initiation of botulinum toxin injections, El reported 30/30 headache days per month, with 5/30 severe headache days per month. Marieloss headaches are quite disabling and often interfere with El's ability to function normally.    Their last round of botulinum toxin injections was on 1/18/2024 (they are overdue for injections today).    El reports 30/30 headache days per month currently, with 0 severe headache days per month. El is overdue for injections today.     El reports the following benefits of botulinum toxin injections from their last round: With both Botox and Qulipta on board, daily headache is down to a 1-2/10 rather than a 3-4/10.     El has attempted other migraine prophylactic treatments in the past, which have included: topiramate, metoprolol, sertraline, gabapentin, magnesium.     El currently takes propranolol, Qulipta, duloxetine.    Patient's pain was assessed prior to the procedure. El rated El's pain today as 4 out of 10.      The procedure was explained to the patient. Benefits of the treatment were discussed including headache and migraine reduction. Risks of the  procedure were reviewed including but not limited to pain, bruising, bleeding, infection, and weakness of muscles injected or those distal to injection sites. Alternatives were discussed. The patient voiced understanding of the risks and benefits. All questions answered and patient consented to proceed.    Procedural Pause: Procedural pause was conducted to verify correct patient identity, procedure to be performed, correct side and site, correct patient position, and special requirements. Appropriate hand hygiene was utilized, and each injection site was prepped with alcohol wipes or Chloraprep swab.     Procedure Details:   200 units of onabotulinumtoxinA was diluted in 4 mL 0.9% normal saline.   A total of 150 units of onabotulinumtoxinA were injected using 30 gauge 0.5 inch needles into the muscles listed below. 50 units of onabotulinumtoxinA were wasted.     Injection Sites: Total = 150 units onabotulinumtoxinA     Procerus muscles - 5 units into the procerus muscle (5 units total)     muscles - 5 units into the left  muscle and 5 units into the right  muscle (1 injection site per muscle) (10 units total)    Frontalis muscles - 5 units into the left superior frontalis muscle and 5 units into the right superior frontalis muscle (2 injection sites per muscle) (10 units total)    Temporalis muscles - 12.5 units into the left temporalis muscle and 12.5 units into the right temporalis muscle (2 injection sites per muscle) (25 units total)    Occipitalis muscles - 12.5 units into the left occipitalis muscle and 12.5 units into the right occipitalis muscle (2 injection sites per muscle) (25 units total)    Splenius Capitis muscles - 12.5 units into the left splenius capitis muscle and 12.5 units into the right splenius capitis muscle (2 injection sites per muscle, divided into 2/3 anteriorly and 1/3 posteriorly) (25 units total)      Trapezius muscles - 25 units into the left trapezius  muscle and 25 units into the right trapezius muscle (3 injection sites per muscle, divided into 5 units, 10 units, 10 units, medial to lateral) (50 units total)      Patient tolerated the procedure well without immediate complications. El will follow up in clinic for assessment of the effectiveness of treatment. El did not report any change in El's pain level after the botulinumtoxinA injection procedure.      Idania Reynolds PA-C  Headache Neurology  [facility]      Again, thank you for allowing me to participate in the care of your patient.        Sincerely,        IDANIA REYNOLDS PA-C

## 2024-06-20 NOTE — TELEPHONE ENCOUNTER
Left Voicemail (1st Attempt) and Sent Mychart (1st Attempt) for the patient to call back and schedule the following:    Appointment type: Return Headache  Provider: Maliha LEON CNP  Return date: On or near June 2025  Specialty phone number: [phone]  Additional appointment(s) needed: NA  Additonal Notes: 1 yr follow up    Yolette Clark on 6/20/2024 at 5:16 PM

## 2024-06-21 DIAGNOSIS — G43.709 CHRONIC MIGRAINE WITHOUT AURA WITHOUT STATUS MIGRAINOSUS, NOT INTRACTABLE: Primary | ICD-10-CM

## 2024-06-21 DIAGNOSIS — G43.719 INTRACTABLE CHRONIC MIGRAINE WITHOUT AURA AND WITHOUT STATUS MIGRAINOSUS: ICD-10-CM

## 2024-06-24 NOTE — TELEPHONE ENCOUNTER
Left Voicemail (2nd Attempt) for the patient to call back and schedule the following:    Appointment type: Return Headache  Provider: Maliha LEON CNP  Return date: On or near June 2025  Specialty phone number: [phone]  Additional appointment(s) needed: NA  Additonal Notes: 1 yr follow up    Valentina Phelps on 6/24/2024 at 11:22 AM

## 2024-08-08 ENCOUNTER — TELEPHONE (OUTPATIENT)
Dept: NEUROLOGY | Facility: CLINIC | Age: 19
End: 2024-08-08
Payer: COMMERCIAL

## 2024-08-08 NOTE — TELEPHONE ENCOUNTER
"Prior Authorization Retail Medication Request - RENEWAL     Medication/Dose: Qulipta 60 mg  Diagnosis and ICD code (if different than what is on RX):    New/renewal/insurance change PA/secondary ins. PA:  Previously Tried and Failed:  Took amitriptyline 25 mg for a while and made headaches worse at night and after stopping amitriptyline \"worsening headaches at night\" improved   Currently Botox   Rizatriptan as needed and helps with the pain -\"makes it bearable\" does not make it to go away but works the best than the other treatments and ondansetron as needed -takes it may be once every 3 month   Biofeedback, acupincture  and physical therapy for MSK/joint it did not help   Propranolol 160 mg daily -helps with cardiac symptoms and did not help with  headaches at all  Magnesium   Rizatriptan as needed currently on   Amitriptyline 25 mg at bedtime   Topiramate 25 mg BID tried -does not recall side effects   Metoprolol  Tried  Propranolol 80 mg ER started a few days ago   Duloxetine caused worsening headaches   On Zoloft and works good  On gabapentin 600 mg three times daily   Ondansetron as needed      Rationale:  daily headaches 3-4/10 on the numeric pain scale. Just in the temples -daily headaches      Insurance   Primary: [facility]  Insurance ID:  [id number]     Secondary (if applicable):  Insurance ID:       Pharmac Information (if different than what is on RX)  Name:    Phone:    Fax:   "

## 2024-08-09 NOTE — TELEPHONE ENCOUNTER
Retail Pharmacy Prior Authorization Team   Phone: [phone]    Prior Authorization Approval    Medication: QULIPTA 60 MG PO TABS  Authorization Effective Date: 7/10/2024  Authorization Expiration Date: 8/9/2025  Insurance Company: [employer] - Phone [phone] Fax [phone]  Which Pharmacy is filling the prescription: [id number] - [place], [place] - [address] AT [facility] [address]  Pharmacy Notified: yes  Patient Notified: yes **Instructed pharmacy to notify patient when script is ready to /ship.**

## 2024-08-09 NOTE — TELEPHONE ENCOUNTER
Retail Pharmacy Prior Authorization Team   Phone: [phone]    PA Initiation    Medication: QULIPTA 60 MG PO TABS  Insurance Company: [employer] - Phone [phone] Fax [phone]  Pharmacy Filling the Rx: [id number] - [place], [place] - [address] AT [facility] [address]  Filling Pharmacy Phone: [phone]  Filling Pharmacy Fax:    Start Date: 8/9/2024      Note: Due to record-high volumes, our turn-around time is taking longer than usual . We are currently 6 days behind in the pools.   We are working diligently to submit all requests in a timely manner and in the order they are received. Please only flag TRUE URGENT requests as high priority to the pool at this time.   If you have questions on status of PA's,  please send a note/message in the active PA encounter and send back to the [facility] PA pool [[id number]].    If you have questions about the turn-around time or about our process, please reach out to our supervisor Emma Walker.   Thank you!   RPPA (Retail Pharmacy Prior Authorization) team

## 2024-08-12 DIAGNOSIS — G43.719 INTRACTABLE CHRONIC MIGRAINE WITHOUT AURA AND WITHOUT STATUS MIGRAINOSUS: ICD-10-CM

## 2024-08-12 NOTE — TELEPHONE ENCOUNTER
RX Authorization    Medication: Atogepant (QULIPTA) 60 MG TABS    Date last refill ordered: 2/6/2024    Quantity ordered: 30    # refills: 11    Date of last clinic visit with ordering provider: 6/20/2024    Date of next clinic visit with ordering provider:    All pertinent protocol data (lab date/result):    Include pertinent information from patients message:

## 2024-08-13 RX ORDER — ATOGEPANT 60 MG/1
60 TABLET ORAL DAILY
Qty: 30 TABLET | Refills: 11 | Status: SHIPPED | OUTPATIENT
Start: 2024-08-13

## 2024-09-13 ENCOUNTER — OFFICE VISIT (OUTPATIENT)
Dept: NEUROLOGY | Facility: CLINIC | Age: 19
End: 2024-09-13
Payer: COMMERCIAL

## 2024-09-13 DIAGNOSIS — G43.709 CHRONIC MIGRAINE WITHOUT AURA WITHOUT STATUS MIGRAINOSUS, NOT INTRACTABLE: Primary | ICD-10-CM

## 2024-09-13 PROCEDURE — 64615 CHEMODENERV MUSC MIGRAINE: CPT | Performed by: PSYCHIATRY & NEUROLOGY

## 2024-09-13 NOTE — PROGRESS NOTES
[facility]  Botulinum Toxin Procedure    Carley Ashraf MD  Headache Neurology    September 13, 2024    Procedure:  OnabotulinumtoxinA injections for chronic migraine  Indication:  Chronic migraine     El Chen suffers from severe intractable headaches. El was referred by Maliha Soriano NP for onabotulinumtoxinA injections for headache.       Their baseline headaches are sharp or throbbing pain located at the temples or behind the eyes with accompanied photophobia, phonophobia, nausea, fatigue and untreated headaches could last 1-2 days.      Prior to initiation of botulinum toxin injections, El reported 30/30 headache days per month, with 5/30 severe headache days per month. El's headaches are quite disabling and often interfere with El's ability to function normally.     Their last round of botulinum toxin injections was on 6/20/2024.     El reports 30/30 headache days per month currently, with 0 severe headache days per month. El is overdue for injections today.      El reports the following benefits of botulinum toxin injections from their last round: With both Botox and Qulipta on board, daily headache is down to a 1-2/10. No migraine days when Botox is active.      El has attempted other migraine prophylactic treatments in the past, which have included: topiramate, metoprolol, sertraline, gabapentin, magnesium.     El currently takes propranolol, Qulipta, duloxetine.    Procedural Pause: Procedural pause was conducted to verify correct patient identity, procedure to be performed, correct side and site, correct patient position, and special requirements. Appropriate hand hygiene was utilized, and each injection site was prepped with alcohol wipe or Chloraprep swab.     Procedure Details: 200 units of onabotulinumtoxinA was diluted in 4 mL 0.9% normal saline. A total of 150 units of onabotulinumtoxinA were injected using 30 gauge 0.5 in needles into the muscles listed  below. 50 units of onabotulinumtoxinA were wasted.     Injection Sites: Total = 150 units onabotulinumtoxinA      and Procerus muscles - 5 units into the left and right corrugators and 5 units into the procerus (15 units total)    Frontalis muscles - 5 units into the left superior frontalis and 5 units into the right superior frontalis (2 injection sites per muscle) (10 units total)    Temporalis muscles - 12.5 units into the left temporalis muscle and 12.5 units into the right temporalis muscle (2 injection sites per muscle) (25 units total)    Occipitalis muscles - 12.5 units into the left occipitalis muscle and 12.5 units into the right occipitalis muscle (2 injection sites per muscle) (25 units total)    Splenius Capitis muscles - 12.5 units into the left splenius capitis muscle and 12.5 units into the right splenius capitis muscle (2 injection sites per muscle, divided into 2/3 anteriorly and 1/3 posteriorly) (25 units total)      Trapezius muscles - 25 units into the left trapezius muscle and 25 units into the right trapezius muscle (3 injection sites per muscle, divided 5 units, 10 units, 10 units, medial to lateral) (50 units total)      April tolerated the procedure well without immediate complications.  El will follow up in clinic for assessment of the effectiveness of treatment.  El did not report any change in El's pain level after the botulinumtoxinA injection procedure.    Carley Ashraf MD  Headache Neurology  [facility]

## 2024-09-13 NOTE — LETTER
9/13/2024      El Chen  [address]  [place] [address]      Dear Colleague,    Thank you for referring your patient, El Chen, to the [facility] NEUROLOGY CLINICS [facility]. Please see a copy of my visit note below.    [facility]  Botulinum Toxin Procedure    Carley Ashraf MD  Headache Neurology    September 13, 2024    Procedure:  OnabotulinumtoxinA injections for chronic migraine  Indication:  Chronic migraine     El Chen suffers from severe intractable headaches. El was referred by Maliha Soriano NP for onabotulinumtoxinA injections for headache.       Their baseline headaches are sharp or throbbing pain located at the temples or behind the eyes with accompanied photophobia, phonophobia, nausea, fatigue and untreated headaches could last 1-2 days.      Prior to initiation of botulinum toxin injections, El reported 30/30 headache days per month, with 5/30 severe headache days per month. Marieloss headaches are quite disabling and often interfere with El's ability to function normally.     Their last round of botulinum toxin injections was on 6/20/2024.     El reports 30/30 headache days per month currently, with 0 severe headache days per month. El is overdue for injections today.      El reports the following benefits of botulinum toxin injections from their last round: With both Botox and Qulipta on board, daily headache is down to a 1-2/10. No migraine days when Botox is active.      El has attempted other migraine prophylactic treatments in the past, which have included: topiramate, metoprolol, sertraline, gabapentin, magnesium.     El currently takes propranolol, Qulipta, duloxetine.    Procedural Pause: Procedural pause was conducted to verify correct patient identity, procedure to be performed, correct side and site, correct patient position, and special requirements. Appropriate hand hygiene was utilized, and each injection site was prepped  with alcohol wipe or Chloraprep swab.     Procedure Details: 200 units of onabotulinumtoxinA was diluted in 4 mL 0.9% normal saline. A total of 150 units of onabotulinumtoxinA were injected using 30 gauge 0.5 in needles into the muscles listed below. 50 units of onabotulinumtoxinA were wasted.     Injection Sites: Total = 150 units onabotulinumtoxinA      and Procerus muscles - 5 units into the left and right corrugators and 5 units into the procerus (15 units total)    Frontalis muscles - 5 units into the left superior frontalis and 5 units into the right superior frontalis (2 injection sites per muscle) (10 units total)    Temporalis muscles - 12.5 units into the left temporalis muscle and 12.5 units into the right temporalis muscle (2 injection sites per muscle) (25 units total)    Occipitalis muscles - 12.5 units into the left occipitalis muscle and 12.5 units into the right occipitalis muscle (2 injection sites per muscle) (25 units total)    Splenius Capitis muscles - 12.5 units into the left splenius capitis muscle and 12.5 units into the right splenius capitis muscle (2 injection sites per muscle, divided into 2/3 anteriorly and 1/3 posteriorly) (25 units total)      Trapezius muscles - 25 units into the left trapezius muscle and 25 units into the right trapezius muscle (3 injection sites per muscle, divided 5 units, 10 units, 10 units, medial to lateral) (50 units total)      April tolerated the procedure well without immediate complications.  El will follow up in clinic for assessment of the effectiveness of treatment.  El did not report any change in El's pain level after the botulinumtoxinA injection procedure.    Carley Ashraf MD  Headache Neurology  [facility]      Again, thank you for allowing me to participate in the care of your patient.        Sincerely,        Carley Ashraf MD

## 2024-11-10 ENCOUNTER — HEALTH MAINTENANCE LETTER (OUTPATIENT)
Age: 19
End: 2024-11-10

## 2024-11-12 ENCOUNTER — TELEPHONE (OUTPATIENT)
Dept: OTHER | Facility: CLINIC | Age: 19
End: 2024-11-12
Payer: COMMERCIAL

## 2024-11-12 DIAGNOSIS — R10.9 GASTRIC PAIN: Primary | ICD-10-CM

## 2024-11-12 NOTE — TELEPHONE ENCOUNTER
Referral received via Phone on 11/12/2024.    Referred by Dr. Verito Blanco for Dr. Jurado - assessment for MALS or other vascular compression that could be impacting GI.  Office notes in Care Everywhere.    Previous imaging completed (pertinent to referral):  na    Routing to scheduling to coordinate the following:  US Mesenteric Arteries Complete  NEW VASCULAR PATIENT consult with Dr. Jurado  Please schedule this at next available      Appt note:  Referred by Dr. Verito Blanco for Dr. Jurado - assessment for MALS or other vascular compression that could be impacting GI.  Office notes in Care Everywhere.

## 2024-11-12 NOTE — TELEPHONE ENCOUNTER
[facility] [place] [facility]    Who is the name of the provider? New Consult from Radha Blanco ([facility] provider - see 10/04/24 Telemedicine) to Dr Jurado    What is the location you see this provider at/preferred location? [place]    Person calling / Facility: El    Phone number: [phone]    Nurse call back needed: N    Reason for call:  Per 10/04/24 Telemedicine note:    Referred to Surgery - Vascular / Vascular Surgery for Gastric pain , Gastroesophageal reflux disease, unspecified whether esophagitis present ,Hypermobile Silvina-Danlos syndrome   Early satiety. Assessment for MALS or other vascular compression that could be impacting GI          [facility]     Outside Imaging: multiple testing [facility] - Swallow study scheduled for 11/26/24 @ [facility]    Can we leave a detailed message on this number? Y    Additional Info:

## 2024-11-13 NOTE — TELEPHONE ENCOUNTER
Left voicemail for patient to call back to schedule appointment(s), provided telephone number for patient to call back to schedule.    US Mesenteric Arteries Complete  NEW VASCULAR PATIENT consult with Dr. Jurado  Please schedule this at next available        Appt note:  Referred by Dr. Verito Blanco for Dr. Jurado - assessment for MALS or other vascular compression that could be impacting GI.  Office notes in Care Everywhere.

## 2024-12-09 NOTE — PROGRESS NOTES
[place] [facility]    El Chen is referred by Dr. Blanco for possible median arcuate ligament syndrome.  19-year-old patient has had postprandial abdominal pain for at least 1 year.  They will notice fullness and pain immediately after eating last Selseb oral liquids and solids that can last 1 to 2 hours.  Has variable episodes of constipation and loose stools.  Has food fear but has not lost any weight partially due to taking high caloric diet due to her chronic GERD.    Patient does have GERD.  They have been evaluated with a recent swallowing study due to reflux and has appointment with GI medicine in 1 to 2 weeks    --  9/27/2022 Elsa abdominal ultrasound with normal gallbladder no stones  -- 12/7/2023 normal gastric emptying study    PMH: Medications: Maxalt, Cymbalta, albuterol inhaler as needed beyond, Flexeril, Qulipta, Inderal     Medical: Chronic pain syndrome    Dysautonomia--on Inderal    History of migraines    Fibromyalgia    Hypermobile Erler's Danlos syndrome    GERD    History of Tourette's    OCD    Normal TTE echocardiogram 9/5/2020 following.  EF= 55%     Normal LV/RV size and function     (Done to follow-up ED syndrome) no significant valvular issues    Patient is undergoing gender reassignment from female to male.  Underwent bilateral mastectomies for this with no complications on 12/2023.    Recent hand tremors evaluated 11/14/2024 by [place] neurology Dr. Daniels    Exam: Alert and appropriate.  Very pleasant.   Blood pressure 117/81 right 124/84 left.  Pulse 70 regular   HEENT= unremarkable.  Wears glasses.   Chest= clear   Cardiovascular= regular rate   Abdomen= mildly obese, nontender    Dynamic ultrasound was performed today evaluating the celiac and SMA arteries with inspiration and expiration.  Velocities were unchanged and normal in both of these arteries with these maneuvers.          IMPRESSION: Postprandial abdominal pain which could be multifactorial.  Previous  workup revealed normal gallbladder making this etiology unlikely but not completely ruling it out.  Median arcuate ligament syndrome is a combination of an arterial and nerve issue.  At least by the ultrasound there is no evidence at all of a compression of the celiac artery which would make the diagnosis less likely would not completely rule this out and this was discussed.    We discussed the option of performing a CTA where we can evaluate the celiac artery much better to see if there is any extrinsic compression that is typical of this finding.  This would be helpful but even if completely normal this does not necessarily rule out syndrome since it could be related to the celiac ganglion nerve response to eating which is poorly understood but is definitely associated with this syndrome.  Discussed the possibility of a temporary celiac block to see if it helps their symptoms as a diagnostic test.    I would not recommend a celiac block until they been reevaluated by GI medicine and also the CTA completed.    Patient lives in [place], [place] and will have the CT performed at [facility] at their convenience with a follow-up telephone call on the results.    Over 45 minutes with patient today including chart review.    Can Jurado MD   This note was created using Dragon voice recognition software which may result in transcription errors.    CC; Dr. Radha Blanco

## 2024-12-12 ENCOUNTER — OFFICE VISIT (OUTPATIENT)
Dept: OTHER | Facility: CLINIC | Age: 19
End: 2024-12-12
Attending: SURGERY
Payer: COMMERCIAL

## 2024-12-12 VITALS — DIASTOLIC BLOOD PRESSURE: 84 MMHG | SYSTOLIC BLOOD PRESSURE: 124 MMHG | HEART RATE: 72 BPM

## 2024-12-12 DIAGNOSIS — R10.9 GASTRIC PAIN: Primary | ICD-10-CM

## 2024-12-12 PROCEDURE — 99213 OFFICE O/P EST LOW 20 MIN: CPT | Performed by: SURGERY

## 2024-12-12 RX ORDER — METOCLOPRAMIDE 5 MG/1
5 TABLET ORAL
COMMUNITY

## 2024-12-12 RX ORDER — FAMOTIDINE 40 MG/1
40 TABLET, FILM COATED ORAL
COMMUNITY
Start: 2024-12-02

## 2024-12-12 NOTE — PATIENT INSTRUCTIONS
CTA for thank you for choosing [facility] for your vascular care.  Please call us at [phone] if you have any questions or concerns.    CLINIC DISCHARGE INSTRUCTIONS    Patient:  El Chen  Provider: Dr. Jurado    REASON FOR VISIT:    Median arcuate ligament syndrome evaluation    RECOMMENDATIONS:  CTA abdomen/pelvis  A  will reach out to you to coordinate this.  This can be done at [facility] and then have a follow up visit with Dr. Jurado to review imaging.    *If you had a positive experience, please indicate on your patient satisfaction survey form that [facility] will be sending you.  This may also be completed as a phone call.

## 2024-12-12 NOTE — PROGRESS NOTES
[facility]        Patient is here for a consult.    Pt is currently taking no meds that would impact our treatment plan.    /84 (BP Location: Left arm, Patient Position: Chair, Cuff Size: Adult Large)   Pulse 72     The provider has been notified that the patient has no concerns.     Questions patient would like addressed today are: N/A.    Refills are needed: N/A    Has homecare services and agency name:  Fawn Daugherty MA

## 2024-12-13 ENCOUNTER — TELEPHONE (OUTPATIENT)
Dept: OTHER | Facility: CLINIC | Age: 19
End: 2024-12-13
Payer: COMMERCIAL

## 2024-12-13 NOTE — TELEPHONE ENCOUNTER
Per 12/12/24 visit with Dr. Jurado, pt needs the following:     CTA A/P  In clinic appt with Dr. Jurado to discuss results  Please schedule this at next available, in approximately 1 month (pt also needs to have GI workup done)    Routing to scheduling to contact patient to coordinate above.    Appt note in order comments.    Kaylynn Fonseca, HEMANTN, RN, CV-BC, CNOR  [facility]

## 2024-12-16 NOTE — TELEPHONE ENCOUNTER
Patient scheduled for imaging and follow up. Will see GI doctor on 12/18/24      Bhumika Sosa MA

## 2025-01-13 NOTE — PROGRESS NOTES
[place] [facility]    El Chen is a 19-year-old who comes for evaluation of potential MALS.  Complaining of postprandial abdominal pain.  Normal GI and gallbladder workup was evaluated 12/12/2024 for this.  Dynamic duplex with no velocity criteria for this syndrome.    PMH: Medications: Qulipta, Inderal, Maxalt, Cymbalta    Velocity criteria could not rule out potential MALS we recommended a CTA.  This was performed on 1/2/2025 and reviewed.  Aorta and all great vessels are normal.  No evidence at all for compression of the median arcuate ligament to the celiac axis.  Widely patent AUP-XPP-toini vessels.    Reviewed evaluation on 12/18/2024 from [facility].  Discussed longstanding GERD.  Also possible gastroparesis.  Recommended Imipipramine 10 mg nightly along with  esomeprazole.     Exam: Alert and appropriate.  Comfortable.    Reports little change on the more recent medications from [facility]  (seen after our visit).  They have been on Reglan and this has helped.  Makes their bowel movements much more regular with fewer episodes of constipation followed by very loose stools.      We went over the CTA images.  Completely normal aorta and iliac arteries.  No compression all the large celiac artery.  Normal SMA-renal arteries-DIXIE.  No gallstones.  No hiatal hernia.  Normal kidneys.     IMPRESSION:   I do not feel their symptoms are related at all to ligament syndrome and it is not discussed.  Patient understands this after reviewing the images with me today.    No vascular follow-up is indicated.  They are very comfortable with this recommendation.    15 minutes with patient today    Can Jurado MD   This note was created using Dragon voice recognition software which may result in transcription errors.

## 2025-01-16 ENCOUNTER — OFFICE VISIT (OUTPATIENT)
Dept: OTHER | Facility: CLINIC | Age: 20
End: 2025-01-16
Attending: SURGERY
Payer: COMMERCIAL

## 2025-01-16 VITALS — DIASTOLIC BLOOD PRESSURE: 88 MMHG | HEART RATE: 87 BPM | SYSTOLIC BLOOD PRESSURE: 137 MMHG

## 2025-01-16 DIAGNOSIS — R10.84 GENERALIZED POSTPRANDIAL ABDOMINAL PAIN: Primary | ICD-10-CM

## 2025-01-16 PROCEDURE — 99213 OFFICE O/P EST LOW 20 MIN: CPT | Performed by: SURGERY

## 2025-01-16 RX ORDER — ESOMEPRAZOLE MAGNESIUM 40 MG/1
40 CAPSULE, DELAYED RELEASE ORAL
COMMUNITY

## 2025-01-16 RX ORDER — IMIPRAMINE HYDROCHLORIDE 10 MG/1
10 TABLET, FILM COATED ORAL AT BEDTIME
COMMUNITY

## 2025-01-16 RX ORDER — DESOGESTREL AND ETHINYL ESTRADIOL 0.15-0.03
1 KIT ORAL DAILY
COMMUNITY
Start: 2025-01-10

## 2025-01-16 NOTE — PROGRESS NOTES
[facility]        Patient is here for a follow up.    Pt is currently taking no meds that would impact our treatment plan.    /88 (BP Location: Left arm, Patient Position: Sitting, Cuff Size: Adult Regular)   Pulse 87     The provider has been notified that the patient has concerns of possible MALS     Questions patient would like addressed today are: N/A.    Refills are needed: N/A    Has homecare services and agency name:  Fawn Sosa MA

## 2025-07-22 ENCOUNTER — MYC REFILL (OUTPATIENT)
Dept: NEUROLOGY | Facility: CLINIC | Age: 20
End: 2025-07-22
Payer: COMMERCIAL

## 2025-07-22 DIAGNOSIS — G43.719 INTRACTABLE CHRONIC MIGRAINE WITHOUT AURA AND WITHOUT STATUS MIGRAINOSUS: ICD-10-CM

## 2025-07-23 ENCOUNTER — TELEPHONE (OUTPATIENT)
Dept: NEUROLOGY | Facility: CLINIC | Age: 20
End: 2025-07-23
Payer: COMMERCIAL

## 2025-07-23 NOTE — TELEPHONE ENCOUNTER
"Prior Authorization Retail Medication Request - RENEWAL     Medication/Dose: Qulipta 60 mg  Diagnosis and ICD code (if different than what is on RX):    New/renewal/insurance change PA/secondary ins. PA:  Previously Tried and Failed:  Took amitriptyline 25 mg for a while and made headaches worse at night and after stopping amitriptyline \"worsening headaches at night\" improved   Currently Botox   Rizatriptan as needed and helps with the pain -\"makes it bearable\" does not make it to go away but works the best than the other treatments and ondansetron as needed -takes it may be once every 3 month   Biofeedback, acupincture  and physical therapy for MSK/joint it did not help   Propranolol 160 mg daily -helps with cardiac symptoms and did not help with  headaches at all  Magnesium   Rizatriptan as needed currently on   Amitriptyline 25 mg at bedtime   Topiramate 25 mg BID tried -does not recall side effects   Metoprolol  Tried  Propranolol 80 mg ER started a few days ago   Duloxetine caused worsening headaches   On Zoloft and works good  On gabapentin 600 mg three times daily   Ondansetron as needed      Rationale:  daily headaches 3-4/10 on the numeric pain scale. Just in the temples -daily headaches      Insurance   Primary: [employer]  Insurance ID:  [id number]     Secondary (if applicable): [employer]  Insurance ID:  [id number]      Pharmac Information (if different than what is on RX)  Name:    Phone:    Fax:   "

## 2025-07-27 NOTE — TELEPHONE ENCOUNTER
Prior Authorization Approval    Medication: QULIPTA 60 MG PO TABS  Authorization Effective Date: 6/27/2025  Authorization Expiration Date: 7/27/2026  Approved Dose/Quantity: 30 tabs per 30 days  Reference #: [id number]   Insurance Company: [employer] [place] - Phone [phone] Fax [phone]  Expected CoPay: $    CoPay Card Available: Yes    Financial Assistance Needed:   Which Pharmacy is filling the prescription: [id number] - [place], [place] - [address] AT [facility] [address]  Pharmacy Notified: Yes, faxed approval and asked to process RX and notify patient.  Patient Notified: Yes

## 2025-07-27 NOTE — TELEPHONE ENCOUNTER
PA Initiation    Medication: QULIPTA 60 MG PO TABS  Insurance Company: [facility] - Phone [phone] Fax [phone]  Pharmacy Filling the Rx: [id number] - [place], [place] - [address] AT [facility] [address]  Filling Pharmacy Phone:    Filling Pharmacy Fax:    Start Date: 7/27/2025    RAS    Will update once full approval is back.

## 2025-07-31 ENCOUNTER — MEDICAL CORRESPONDENCE (OUTPATIENT)
Dept: HEALTH INFORMATION MANAGEMENT | Facility: CLINIC | Age: 20
End: 2025-07-31
Payer: COMMERCIAL

## 2025-08-14 DIAGNOSIS — E66.9 OBESITY: Primary | ICD-10-CM

## 2025-08-18 ENCOUNTER — PATIENT OUTREACH (OUTPATIENT)
Dept: CARE COORDINATION | Facility: CLINIC | Age: 20
End: 2025-08-18
Payer: COMMERCIAL

## 2025-08-19 ENCOUNTER — TELEPHONE (OUTPATIENT)
Dept: SURGERY | Facility: CLINIC | Age: 20
End: 2025-08-19
Payer: COMMERCIAL

## (undated) DEVICE — SOL ADH LIQUID BENZOIN SWAB 0.6ML C1544

## (undated) DEVICE — DRAPE BREAST/CHEST 29420

## (undated) DEVICE — DRSG KERLIX 4 1/2"X4YDS ROLL 6715

## (undated) DEVICE — SOL NACL 0.9% IRRIG 1000ML BOTTLE 2F7124

## (undated) DEVICE — PAD CHUX UNDERPAD 23X24" 7136

## (undated) DEVICE — TUBING SUCTION LIPECTOMY

## (undated) DEVICE — SU PDS II 2-0 CT-1 27" Z339H

## (undated) DEVICE — PEN MARKING SKIN W/LABELS 31145884

## (undated) DEVICE — DRAIN JACKSON PRATT 15FR ROUND SU130-1323

## (undated) DEVICE — STPL SKIN 35W ROTATING HEAD PRW35

## (undated) DEVICE — PACK MAJOR SBA15MAFSI

## (undated) DEVICE — SU MONOCRYL 4-0 PS-2 18" UND Y496G

## (undated) DEVICE — SU PLAIN FAST ABSORB 5-0 PC-1 18" 1915G

## (undated) DEVICE — BLADE KNIFE SURG 10 371110

## (undated) DEVICE — ESU ELEC BLADE 2.75" COATED/INSULATED E1455

## (undated) DEVICE — SU ETHILON 3-0 FS-1 18" 669H

## (undated) DEVICE — BLADE KNIFE SURG 15 371115

## (undated) DEVICE — DRSG XEROFORM 1X8"

## (undated) DEVICE — DRSG TEGADERM 4X4 3/4" 1626

## (undated) DEVICE — DRSG STERI STRIP 1X5" R1548

## (undated) DEVICE — GLOVE BIOGEL PI MICRO SZ 6.5 48565

## (undated) DEVICE — ESU PENCIL W/HOLSTER E2350H

## (undated) DEVICE — LINEN TOWEL PACK X5 5464

## (undated) DEVICE — PREP SKIN SCRUB TRAY 4461A

## (undated) DEVICE — SU MONOCRYL 5-0 P-3 18" UND Y493G

## (undated) DEVICE — SUCTION CANISTER MEDIVAC LINER 1500ML W/LID 65651-515

## (undated) DEVICE — TUBING INFILTRATION SINGLE SPIKE 188" 24-6001-00

## (undated) DEVICE — ESU ELEC BLADE HEX-LOCKING 2.5" E1450X

## (undated) DEVICE — SU MONOCRYL 3-0 PS-2 27" Y427H

## (undated) DEVICE — SOL WATER IRRIG 1000ML BOTTLE 2F7114

## (undated) DEVICE — DRAIN JACKSON PRATT RESERVOIR 100ML SU130-1305

## (undated) RX ORDER — HYDROMORPHONE HYDROCHLORIDE 1 MG/ML
INJECTION, SOLUTION INTRAMUSCULAR; INTRAVENOUS; SUBCUTANEOUS
Status: DISPENSED
Start: 2023-12-19

## (undated) RX ORDER — ACETAMINOPHEN 500 MG
TABLET ORAL
Status: DISPENSED
Start: 2023-12-19

## (undated) RX ORDER — FENTANYL CITRATE 50 UG/ML
INJECTION, SOLUTION INTRAMUSCULAR; INTRAVENOUS
Status: DISPENSED
Start: 2023-12-19

## (undated) RX ORDER — FENTANYL CITRATE 0.05 MG/ML
INJECTION, SOLUTION INTRAMUSCULAR; INTRAVENOUS
Status: DISPENSED
Start: 2023-12-19

## (undated) RX ORDER — SCOLOPAMINE TRANSDERMAL SYSTEM 1 MG/1
PATCH, EXTENDED RELEASE TRANSDERMAL
Status: DISPENSED
Start: 2023-12-19

## (undated) RX ORDER — HYDROCODONE BITARTRATE AND ACETAMINOPHEN 5; 325 MG/1; MG/1
TABLET ORAL
Status: DISPENSED
Start: 2023-12-19